# Patient Record
(demographics unavailable — no encounter records)

---

## 2024-10-16 NOTE — HISTORY OF PRESENT ILLNESS
[FreeTextEntry1] :  POSTPARTUM 6 WEEKS  35yo,  Type of delivery:  C/section, arrest of descent and failed trial of labor after . Repeat low segment transverse . Date 24 EBL/   Sex: girl,  Name: Trixie  Birth weight:  8lbs      Current weight: 10/9/24 12lbs 14oz     Breastfeeding well, exclusively breastmilk   Bleeding stopped at 3wks pp   incision healed   Eating and drinking well   Sleeping when she can   Baby blues: denies   Support: reports good support  Last pap NILM 24     PE:  Breasts soft, NT, no masses, nipples intact, lactating  Abdomen soft, NT. Incision healed  Ext neg   Pelvic:  BUS neg  Vulva no lesions  Vagina well-healed  Cx LCP, NT  Uterus NT, involuted  Adnexa no masses, NT  Tone: normal      A: 6 week PP doing well, lactating mother      P: Self-care and baby care discussed Breastfeeding reviewed Diet and vitamins reviewed Cleared for exercise and sex Contraception options discussed--will use consider options RTO for annual/prn

## 2024-10-16 NOTE — HISTORY OF PRESENT ILLNESS
[FreeTextEntry1] :  POSTPARTUM 6 WEEKS  37yo,  Type of delivery:  C/section, arrest of descent and failed trial of labor after . Repeat low segment transverse . Date 24 EBL/   Sex: girl,  Name: Trixie  Birth weight:  8lbs      Current weight: 10/9/24 12lbs 14oz     Breastfeeding well, exclusively breastmilk   Bleeding stopped at 3wks pp   incision healed   Eating and drinking well   Sleeping when she can   Baby blues: denies   Support: reports good support  Last pap NILM 24     PE:  Breasts soft, NT, no masses, nipples intact, lactating  Abdomen soft, NT. Incision healed  Ext neg   Pelvic:  BUS neg  Vulva no lesions  Vagina well-healed  Cx LCP, NT  Uterus NT, involuted  Adnexa no masses, NT  Tone: normal      A: 6 week PP doing well, lactating mother      P: Self-care and baby care discussed Breastfeeding reviewed Diet and vitamins reviewed Cleared for exercise and sex Contraception options discussed--will use consider options RTO for annual/prn